# Patient Record
Sex: MALE | Race: WHITE
[De-identification: names, ages, dates, MRNs, and addresses within clinical notes are randomized per-mention and may not be internally consistent; named-entity substitution may affect disease eponyms.]

---

## 2018-01-10 ENCOUNTER — HOSPITAL ENCOUNTER (EMERGENCY)
Dept: HOSPITAL 96 - M.ERS | Age: 43
Discharge: HOME | End: 2018-01-10
Payer: COMMERCIAL

## 2018-01-10 VITALS — WEIGHT: 315 LBS | BODY MASS INDEX: 42.66 KG/M2 | HEIGHT: 72 IN

## 2018-01-10 VITALS — DIASTOLIC BLOOD PRESSURE: 96 MMHG | SYSTOLIC BLOOD PRESSURE: 171 MMHG

## 2018-01-10 DIAGNOSIS — R07.89: Primary | ICD-10-CM

## 2018-01-10 DIAGNOSIS — Z90.49: ICD-10-CM

## 2018-01-10 DIAGNOSIS — I10: ICD-10-CM

## 2018-01-10 DIAGNOSIS — Z98.890: ICD-10-CM

## 2018-01-10 LAB
ABSOLUTE BASOPHILS: 0.1 THOU/UL (ref 0–0.2)
ABSOLUTE EOSINOPHILS: 0.1 THOU/UL (ref 0–0.7)
ABSOLUTE MONOCYTES: 0.5 THOU/UL (ref 0–1.2)
ALBUMIN SERPL-MCNC: 3.9 G/DL (ref 3.4–5)
ALP SERPL-CCNC: 77 U/L (ref 46–116)
ALT SERPL-CCNC: 25 U/L (ref 30–65)
ANION GAP SERPL CALC-SCNC: 9 MMOL/L (ref 7–16)
APTT BLD: 27.5 SECONDS (ref 25–31.3)
AST SERPL-CCNC: 19 U/L (ref 15–37)
BASOPHILS NFR BLD AUTO: 1 %
BILIRUB SERPL-MCNC: 0.6 MG/DL
BUN SERPL-MCNC: 14 MG/DL (ref 7–18)
CALCIUM SERPL-MCNC: 9.2 MG/DL (ref 8.5–10.1)
CHLORIDE SERPL-SCNC: 105 MMOL/L (ref 98–107)
CK-MB MASS: 2.4 NG/ML
CO2 SERPL-SCNC: 28 MMOL/L (ref 21–32)
CREAT SERPL-MCNC: 1 MG/DL (ref 0.6–1.3)
EOSINOPHIL NFR BLD: 1.7 %
GLUCOSE SERPL-MCNC: 180 MG/DL (ref 70–99)
GRANULOCYTES NFR BLD MANUAL: 73.9 %
HCT VFR BLD CALC: 48.7 % (ref 42–52)
HGB BLD-MCNC: 16.5 GM/DL (ref 14–18)
INR PPP: 1
LIPASE: 169 U/L (ref 73–393)
LYMPHOCYTES # BLD: 1.2 THOU/UL (ref 0.8–5.3)
LYMPHOCYTES NFR BLD AUTO: 16.7 %
MAGNESIUM SERPL-MCNC: 2 MG/DL (ref 1.8–2.4)
MCH RBC QN AUTO: 31.3 PG (ref 26–34)
MCHC RBC AUTO-ENTMCNC: 33.8 G/DL (ref 28–37)
MCV RBC: 92.6 FL (ref 80–100)
MONOCYTES NFR BLD: 6.7 %
MPV: 8 FL. (ref 7.2–11.1)
NEUTROPHILS # BLD: 5.2 THOU/UL (ref 1.6–8.1)
NT-PRO BRAIN NAT PEPTIDE: 43 PG/ML (ref ?–300)
NUCLEATED RBCS: 0 /100WBC
PLATELET COUNT*: 202 THOU/UL (ref 150–400)
POTASSIUM SERPL-SCNC: 3.7 MMOL/L (ref 3.5–5.1)
PROT SERPL-MCNC: 7.4 G/DL (ref 6.4–8.2)
PROTHROMBIN TIME: 9.7 SECONDS (ref 9.2–11.5)
RBC # BLD AUTO: 5.26 MIL/UL (ref 4.5–6)
RDW-CV: 13.7 % (ref 10.5–14.5)
SODIUM SERPL-SCNC: 142 MMOL/L (ref 136–145)
TROPONIN-I LEVEL: <0.06 NG/ML (ref ?–0.06)
WBC # BLD AUTO: 7 THOU/UL (ref 4–11)

## 2018-01-10 NOTE — EKG
[place] [address]
Phone:  [phone]                     ELECTROCARDIOGRAM REPORT      
_______________________________________________________________________________
 
Name:       ISAURA ROBERTS        [facility]                      [id number]      Account #:      [id number]  
Admission:  01/10/18     Attend Phys:                         
Discharge:  01/10/18     Date of Birth:  [birth year]  
         Report #: [id number]
    [id number]
_______________________________________________________________________________
THIS REPORT FOR:  //name//                      
 
                         [facility] ED
                                       
Test Date:    2018-01-10               Test Time:    11:27:38
Pat Name:     ISAURA ROBERTS          Department:   
Patient ID:   [id number]            Room:          
Gender:       M                        Technician:   
:          1975               Requested By: Frederick Pinzon
Order Number: [id number] MD:   Chico Cota
                                 Measurements
Intervals                              Axis          
Rate:         114                      P:            54
DE:           158                      QRS:          -51
QRSD:         91                       T:            34
QT:           329                                    
QTc:          454                                    
                           Interpretive Statements
Sinus tachycardia
 
 
Electronically Signed On 1- 16:24:37 CST by Chico Cota
[email or web address]
 
 
 
 
 
 
 
 
 
 
 
 
 
 
 
 
 
 
 
 
  <ELECTRONICALLY SIGNED>
                                           By: Chico Cota MD, [facility]      
  01/10/18     1624
D: 01/10/18 1127   _____________________________________
T: 01/10/18 1127   Chico Cota MD, FACC        /EPI

## 2018-02-19 ENCOUNTER — HOSPITAL ENCOUNTER (OUTPATIENT)
Dept: HOSPITAL 96 - M.CT | Age: 43
End: 2018-02-19
Attending: FAMILY MEDICINE
Payer: COMMERCIAL

## 2018-02-19 DIAGNOSIS — Z90.49: ICD-10-CM

## 2018-02-19 DIAGNOSIS — K42.9: Primary | ICD-10-CM

## 2018-02-19 DIAGNOSIS — I10: ICD-10-CM

## 2018-02-19 LAB
BUN SERPL-MCNC: 17 MG/DL (ref 7–18)
CREAT SERPL-MCNC: 1.1 MG/DL (ref 0.6–1.3)

## 2018-08-28 ENCOUNTER — HOSPITAL ENCOUNTER (EMERGENCY)
Dept: HOSPITAL 96 - M.ERS | Age: 43
Discharge: HOME | End: 2018-08-28
Payer: COMMERCIAL

## 2018-08-28 VITALS — HEIGHT: 72 IN | WEIGHT: 315 LBS | BODY MASS INDEX: 42.66 KG/M2

## 2018-08-28 VITALS — DIASTOLIC BLOOD PRESSURE: 71 MMHG | SYSTOLIC BLOOD PRESSURE: 129 MMHG

## 2018-08-28 DIAGNOSIS — R07.89: ICD-10-CM

## 2018-08-28 DIAGNOSIS — E86.0: Primary | ICD-10-CM

## 2018-08-28 DIAGNOSIS — R53.1: ICD-10-CM

## 2018-08-28 DIAGNOSIS — R06.02: ICD-10-CM

## 2018-08-28 DIAGNOSIS — I10: ICD-10-CM

## 2018-08-28 DIAGNOSIS — Z88.6: ICD-10-CM

## 2018-08-28 LAB
ABSOLUTE BASOPHILS: 0.1 THOU/UL (ref 0–0.2)
ABSOLUTE EOSINOPHILS: 0.1 THOU/UL (ref 0–0.7)
ABSOLUTE MONOCYTES: 0.8 THOU/UL (ref 0–1.2)
ALBUMIN SERPL-MCNC: 4 G/DL (ref 3.4–5)
ALP SERPL-CCNC: 60 U/L (ref 46–116)
ALT SERPL-CCNC: 25 U/L (ref 30–65)
ANION GAP SERPL CALC-SCNC: 6 MMOL/L (ref 7–16)
AST SERPL-CCNC: 16 U/L (ref 15–37)
BASOPHILS NFR BLD AUTO: 1.2 %
BILIRUB SERPL-MCNC: 0.8 MG/DL
BUN SERPL-MCNC: 21 MG/DL (ref 7–18)
CALCIUM SERPL-MCNC: 8.8 MG/DL (ref 8.5–10.1)
CHLORIDE SERPL-SCNC: 103 MMOL/L (ref 98–107)
CO2 SERPL-SCNC: 29 MMOL/L (ref 21–32)
CREAT SERPL-MCNC: 1.8 MG/DL (ref 0.6–1.3)
EOSINOPHIL NFR BLD: 0.8 %
GLUCOSE SERPL-MCNC: 102 MG/DL (ref 70–99)
GRANULOCYTES NFR BLD MANUAL: 74 %
HCT VFR BLD CALC: 47.1 % (ref 42–52)
HGB BLD-MCNC: 15.8 GM/DL (ref 14–18)
LIPASE: 154 U/L (ref 73–393)
LYMPHOCYTES # BLD: 1.4 THOU/UL (ref 0.8–5.3)
LYMPHOCYTES NFR BLD AUTO: 15.6 %
MAGNESIUM SERPL-MCNC: 2.1 MG/DL (ref 1.8–2.4)
MCH RBC QN AUTO: 30.7 PG (ref 26–34)
MCHC RBC AUTO-ENTMCNC: 33.6 G/DL (ref 28–37)
MCV RBC: 91.3 FL (ref 80–100)
MONOCYTES NFR BLD: 8.4 %
MPV: 7.8 FL. (ref 7.2–11.1)
NEUTROPHILS # BLD: 6.8 THOU/UL (ref 1.6–8.1)
NT-PRO BRAIN NAT PEPTIDE: 50 PG/ML (ref ?–300)
NUCLEATED RBCS: 0 /100WBC
PLATELET COUNT*: 227 THOU/UL (ref 150–400)
POTASSIUM SERPL-SCNC: 4.2 MMOL/L (ref 3.5–5.1)
PROT SERPL-MCNC: 7.7 G/DL (ref 6.4–8.2)
RBC # BLD AUTO: 5.16 MIL/UL (ref 4.5–6)
RDW-CV: 14.3 % (ref 10.5–14.5)
SODIUM SERPL-SCNC: 138 MMOL/L (ref 136–145)
TROPONIN-I LEVEL: <0.06 NG/ML (ref ?–0.06)
WBC # BLD AUTO: 9.2 THOU/UL (ref 4–11)

## 2018-08-28 NOTE — EKG
[place] [address]
Phone:  [phone]                     ELECTROCARDIOGRAM REPORT      
_______________________________________________________________________________
 
Name:       ISAURA ROBERTS        Room:                      REG ER  
M.R.#:  [id number]      Account #:      [id number]  
Admission:  18     Attend Phys:                         
Discharge:               Date of Birth:  [birth year]  
         Report #: [id number]
    [id number]
_______________________________________________________________________________
THIS REPORT FOR:  //name//                      
 
                         [facility] ED
                                       
Test Date:    2018               Test Time:    13:52:05
Pat Name:     ISAURA ROBERTS          Department:   
Patient ID:   [id number]            Room:          
Gender:       M                        Technician:   TS
:          1975               Requested By: Carlos Eduardo Morelos
Order Number: [id number] MD:   Arnulfo Agarwal
                                 Measurements
Intervals                              Axis          
Rate:         57                       P:            6
[place]:           164                      QRS:          3
QRSD:         144                      T:            35
QT:           417                                    
QTc:          406                                    
                           Interpretive Statements
Sinus rhythm
Compared to ECG 01/10/2018 11:27:38
Sinus tachycardia no longer present
 
Electronically Signed On 2018 14:56:15 CDT by Arnulfo Agarwal
[email or web address]
 
 
 
 
 
 
 
 
 
 
 
 
 
 
 
 
 
 
 
  <ELECTRONICALLY SIGNED>
                                           By: Arnulfo Agarwal MD, [facility]   
  18     1456
D: 18 1352   _____________________________________
T: 18 1352   Arnulfo Agarwal MD, FACC     /EPI